# Patient Record
Sex: MALE | Race: WHITE | NOT HISPANIC OR LATINO | Employment: FULL TIME | URBAN - METROPOLITAN AREA
[De-identification: names, ages, dates, MRNs, and addresses within clinical notes are randomized per-mention and may not be internally consistent; named-entity substitution may affect disease eponyms.]

---

## 2017-08-29 ENCOUNTER — HOSPITAL ENCOUNTER (OUTPATIENT)
Dept: RADIOLOGY | Facility: MEDICAL CENTER | Age: 32
End: 2017-08-29
Attending: NURSE PRACTITIONER
Payer: COMMERCIAL

## 2017-08-29 ENCOUNTER — OFFICE VISIT (OUTPATIENT)
Dept: URGENT CARE | Facility: PHYSICIAN GROUP | Age: 32
End: 2017-08-29
Payer: COMMERCIAL

## 2017-08-29 VITALS
SYSTOLIC BLOOD PRESSURE: 130 MMHG | TEMPERATURE: 98.7 F | HEIGHT: 74 IN | WEIGHT: 187 LBS | DIASTOLIC BLOOD PRESSURE: 84 MMHG | HEART RATE: 86 BPM | BODY MASS INDEX: 24 KG/M2 | OXYGEN SATURATION: 99 % | RESPIRATION RATE: 16 BRPM

## 2017-08-29 DIAGNOSIS — M79.89 SWELLING OF RIGHT RING FINGER: ICD-10-CM

## 2017-08-29 PROCEDURE — 73140 X-RAY EXAM OF FINGER(S): CPT | Mod: RT

## 2017-08-29 PROCEDURE — 99204 OFFICE O/P NEW MOD 45 MIN: CPT | Performed by: NURSE PRACTITIONER

## 2017-08-29 NOTE — PROGRESS NOTES
"Subjective:      Pedro Ott is a 32 y.o. male who presents with Hand Injury (right ring finger, x 6days)            Pt states he was playing softball 6 days ago when he dislocated his right ring finger. He reduced the finger himself and it otherwise felt fine, but this week the finger remains swollen and difficult to bend. He tried a makeshift splint at home without much relief. He is worried it may be broken or still dislocated.         Review of Systems   Musculoskeletal: Positive for joint pain (right ring finger).   All other systems reviewed and are negative.    No past medical history on file. No past surgical history on file.   Social History     Social History   • Marital status: Single     Spouse name: N/A   • Number of children: N/A   • Years of education: N/A     Occupational History   • Not on file.     Social History Main Topics   • Smoking status: Light Tobacco Smoker     Packs/day: 0.25     Types: Cigarettes   • Smokeless tobacco: Never Used   • Alcohol use Yes      Comment: socially   • Drug use: No   • Sexual activity: Not on file     Other Topics Concern   • Not on file     Social History Narrative   • No narrative on file          Objective:     /84   Pulse 86   Temp 37.1 °C (98.7 °F)   Resp 16   Ht 1.88 m (6' 2\")   Wt 84.8 kg (187 lb)   SpO2 99%   BMI 24.01 kg/m²      Physical Exam   Constitutional: He is oriented to person, place, and time. Vital signs are normal. He appears well-developed and well-nourished.   HENT:   Head: Normocephalic and atraumatic.   Eyes: EOM are normal. Pupils are equal, round, and reactive to light.   Neck: Normal range of motion.   Cardiovascular: Normal rate and regular rhythm.    Pulmonary/Chest: Effort normal.   Musculoskeletal: Normal range of motion.        Right hand: He exhibits swelling.        Hands:  Neurological: He is alert and oriented to person, place, and time.   Skin: Skin is warm and dry.   Psychiatric: He has a normal mood and affect. " His speech is normal and behavior is normal. Thought content normal.   Vitals reviewed.         Xray: no fracture or dislocation by my read. Radiology review pending.       8/29/2017 3:31 PM    HISTORY/REASON FOR EXAM:  Pain/Deformity Following Trauma.  .    TECHNIQUE/EXAM DESCRIPTION AND NUMBER OF VIEWS:  3 views of the RIGHT fingers.    COMPARISON: None    FINDINGS:  The alignment and mineralization are normal. No focal areas of soft tissue swelling are appreciated. No fracture or dislocation are identified.   Impression       No fracture or dislocation are appreciated.       Assessment/Plan:     1. Swelling of right ring finger  - DX-FINGER(S) 2+ RIGHT; Future    Straight finger immobilizer applied  Instructed pt to keep this in place for 1-2 weeks  Watch for resolution of swelling  Ibuprofen PRN  RICE  RTC if swelling does not improve with this tx, he V/U  Supportive care, differential diagnoses, and indications for immediate follow-up discussed with patient.    Pathogenesis of diagnosis discussed including typical length and natural progression.      Instructed to return to  or nearest emergency department if symptoms fail to improve, for any change in condition, further concerns, or new concerning symptoms.  Patient states understanding of the plan of care and discharge instructions.

## 2019-12-17 ENCOUNTER — OFFICE VISIT (OUTPATIENT)
Dept: URGENT CARE | Facility: PHYSICIAN GROUP | Age: 34
End: 2019-12-17
Payer: COMMERCIAL

## 2019-12-17 VITALS
OXYGEN SATURATION: 99 % | SYSTOLIC BLOOD PRESSURE: 126 MMHG | BODY MASS INDEX: 23.61 KG/M2 | HEART RATE: 71 BPM | TEMPERATURE: 98.6 F | HEIGHT: 74 IN | DIASTOLIC BLOOD PRESSURE: 74 MMHG | WEIGHT: 184 LBS

## 2019-12-17 DIAGNOSIS — B00.1 COLD SORE: ICD-10-CM

## 2019-12-17 PROCEDURE — 99214 OFFICE O/P EST MOD 30 MIN: CPT | Performed by: PHYSICIAN ASSISTANT

## 2019-12-17 RX ORDER — VALACYCLOVIR HYDROCHLORIDE 1 G/1
2000 TABLET, FILM COATED ORAL 2 TIMES DAILY
Qty: 4 TAB | Refills: 0 | Status: SHIPPED | OUTPATIENT
Start: 2019-12-17 | End: 2019-12-18

## 2019-12-17 ASSESSMENT — ENCOUNTER SYMPTOMS
EYE DISCHARGE: 0
FEVER: 0
SHORTNESS OF BREATH: 0
MYALGIAS: 0
HEADACHES: 0
SORE THROAT: 0
VOMITING: 0
NAUSEA: 0
COUGH: 0
EYE REDNESS: 0

## 2019-12-18 NOTE — PROGRESS NOTES
Subjective:      Pedro Ott is a 34 y.o. male who presents with Cold Sores ([id number] )        HPI    This is a new problem.   The patient presents to clinic c/o a cold to the left corner of his mouth x 4 days. The patient states he has been experiencing intermittent cold sores for the past 2 months. The patient states on average the cold sores last approx. 1.5 weeks. The patient believes the cold sore are related to shaving. The patient states he has changes is razor, but is still experiencing cold sores after shaving. The patient states the cold sore alternate sides from right to left. The patient states the cold sores are not painful. He denies surrounding redness, swelling, and drainage/discharge from the lesions. The patient has tried multiple home remedies, including rubbing alcohol, Abreva, and Neosporin.    The patient denies any additional outbreaks.       PMH:  has no past medical history on file.  MEDS:   Current Outpatient Medications:   •  ibuprofen (MOTRIN) 800 MG TABS, Take 800 mg by mouth every 8 hours as needed., Disp: , Rfl:   •  oxycodone-acetaminophen (PERCOCET) 5-325 MG TABS, Take 1-2 Tabs by mouth every four hours as needed for Mild Pain (Prn pain). (Patient not taking: Reported on 12/17/2019), Disp: 30 Each, Rfl: 0  ALLERGIES: No Known Allergies  SURGHX: No past surgical history on file.  SOCHX:  reports that he has been smoking cigarettes. He has been smoking about 0.25 packs per day. He has never used smokeless tobacco. He reports current alcohol use. He reports that he does not use drugs.  FH: Family history was reviewed, no pertinent findings to report      Review of Systems   Constitutional: Negative for fever.   HENT: Negative for congestion, ear pain and sore throat.    Eyes: Negative for discharge and redness.   Respiratory: Negative for cough and shortness of breath.    Cardiovascular: Negative for chest pain and leg swelling.   Gastrointestinal: Negative for nausea and vomiting.  "  Musculoskeletal: Negative for myalgias.   Skin: Negative for rash.   Neurological: Negative for headaches.          Objective:     /74   Pulse 71   Temp 37 °C (98.6 °F) (Temporal)   Ht 1.88 m (6' 2\")   Wt 83.5 kg (184 lb)   SpO2 99%   BMI 23.62 kg/m²      Physical Exam  Constitutional:       General: He is not in acute distress.     Appearance: Normal appearance.   HENT:      Head: Normocephalic and atraumatic.        Comments:   Perioral Region:  Small vesicular lesion to the left corner of the patients mouth. No tenderness to palpation. No swelling. No surrounding erythema. No increased warmth. No discharge/drainage.      Right Ear: External ear normal.      Left Ear: External ear normal.      Nose: Nose normal.      Mouth/Throat:      Mouth: Mucous membranes are moist.      Pharynx: Oropharynx is clear. No posterior oropharyngeal erythema.   Eyes:      Extraocular Movements: Extraocular movements intact.      Conjunctiva/sclera: Conjunctivae normal.   Neck:      Musculoskeletal: Normal range of motion and neck supple.   Cardiovascular:      Rate and Rhythm: Normal rate and regular rhythm.      Heart sounds: Normal heart sounds.   Pulmonary:      Effort: Pulmonary effort is normal. No respiratory distress.      Breath sounds: Normal breath sounds.   Musculoskeletal: Normal range of motion.   Skin:     General: Skin is warm and dry.   Neurological:      Mental Status: He is alert and oriented to person, place, and time.                 Assessment/Plan:     1. Cold sore  - valacyclovir (VALTREX) 1 GM Tab; Take 2 Tabs by mouth 2 times a day for 1 day.  Dispense: 4 Tab; Refill: 0    The patient is requesting to be treated for a cold sore at this time.  Will prescribe the patient Valacyclovir for his current symptoms.  Recommend the patient follow-up with his PCP.  Discussed return precautions with the patient, and he verbalized understanding.    Differential diagnoses, supportive care, and indications " for immediate follow-up discussed with patient.   Instructed to return to clinic or nearest emergency department for any change in condition, further concerns, or worsening of symptoms.    OTC Tylenol or Motrin for fever/discomfort.  Keep area clean and dry  Monitor for signs of infection   Follow-up with PCP   Return to clinic or go to the ED if symptoms worsen or fail to improve, or if patient develop worsening/increasing/persistent lesions to the area around his mouth, pain/tenderness, swelling, increased redness or warmth to the affected area, discharge/drainage, fever/chills, secondary signs of infection, and/or any concerning symptoms.    Discussed plan with the patient, and she agrees to the above.

## 2020-04-23 ENCOUNTER — APPOINTMENT (RX ONLY)
Dept: URBAN - METROPOLITAN AREA CLINIC 4 | Facility: CLINIC | Age: 35
Setting detail: DERMATOLOGY
End: 2020-04-23

## 2020-04-23 DIAGNOSIS — K13.0 DISEASES OF LIPS: ICD-10-CM

## 2020-04-23 DIAGNOSIS — L21.8 OTHER SEBORRHEIC DERMATITIS: ICD-10-CM

## 2020-04-23 PROCEDURE — ? ADDITIONAL NOTES

## 2020-04-23 PROCEDURE — ? COUNSELING

## 2020-04-23 PROCEDURE — 99202 OFFICE O/P NEW SF 15 MIN: CPT

## 2020-04-23 PROCEDURE — ? PRESCRIPTION

## 2020-04-23 RX ORDER — HYDROCORTISONE 25 MG/G
CREAM TOPICAL
Qty: 1 | Refills: 3 | Status: ERX | COMMUNITY
Start: 2020-04-23

## 2020-04-23 RX ADMIN — HYDROCORTISONE: 25 CREAM TOPICAL at 00:00

## 2020-04-23 ASSESSMENT — LOCATION DETAILED DESCRIPTION DERM
LOCATION DETAILED: LEFT SUPERIOR VERMILION LIP
LOCATION DETAILED: LEFT NASAL ALA
LOCATION DETAILED: LEFT NASAL ALAR GROOVE
LOCATION DETAILED: RIGHT UPPER CUTANEOUS LIP
LOCATION DETAILED: RIGHT ORAL COMMISSURE

## 2020-04-23 ASSESSMENT — LOCATION ZONE DERM
LOCATION ZONE: LIP
LOCATION ZONE: NOSE

## 2020-04-23 ASSESSMENT — LOCATION SIMPLE DESCRIPTION DERM
LOCATION SIMPLE: LEFT LIP
LOCATION SIMPLE: RIGHT LIP
LOCATION SIMPLE: LEFT NOSE

## 2020-04-23 NOTE — PROCEDURE: ADDITIONAL NOTES
Additional Notes: Mild - Not much rash visible today, slight erythema \\nPatient denies any recent dental work \\nRecommended keeping skin protected with Vaseline \\nDuring flares, pt to use a 1:1 ratio of hydrocortisone 2.5% and clotrimazole 1% BID prn\\nTake photos
Detail Level: Simple
Additional Notes: Mild today\\nIf recurs, trial of 1:1 ratio of hydrocortisone 2.5% and clotrimazole 1% BID prn

## 2020-04-23 NOTE — HPI: RASH
How Severe Is Your Rash?: mild
Is This A New Presentation, Or A Follow-Up?: Rash
Additional History: Patient has had cracking in the corners of his mouth on and off for about a year. States that it looks similar to a cold sore and lasts weeks at a time. He has tried neosporin, Vaseline, cold sore medications. At one point, it spread to the corners of his nose, but it has since cleared and not returned

## 2020-04-23 NOTE — PROCEDURE: MIPS QUALITY
Quality 130: Documentation Of Current Medications In The Medical Record: Current Medications Documented
Detail Level: Detailed
Quality 110: Preventive Care And Screening: Influenza Immunization: Influenza immunization was not ordered or administered, reason not given
Quality 226: Preventive Care And Screening: Tobacco Use: Screening And Cessation Intervention: Patient screened for tobacco use and is an ex/non-smoker

## 2020-05-28 ENCOUNTER — OFFICE VISIT (OUTPATIENT)
Dept: MEDICAL GROUP | Facility: MEDICAL CENTER | Age: 35
End: 2020-05-28
Payer: COMMERCIAL

## 2020-05-28 VITALS
RESPIRATION RATE: 16 BRPM | OXYGEN SATURATION: 96 % | HEIGHT: 74 IN | TEMPERATURE: 97.8 F | BODY MASS INDEX: 24.64 KG/M2 | SYSTOLIC BLOOD PRESSURE: 118 MMHG | DIASTOLIC BLOOD PRESSURE: 76 MMHG | WEIGHT: 192 LBS | HEART RATE: 74 BPM

## 2020-05-28 DIAGNOSIS — Z00.00 WELL ADULT EXAM: ICD-10-CM

## 2020-05-28 DIAGNOSIS — Z72.0 TOBACCO USE: ICD-10-CM

## 2020-05-28 DIAGNOSIS — Z13.6 SCREENING FOR CARDIOVASCULAR CONDITION: ICD-10-CM

## 2020-05-28 PROCEDURE — 99395 PREV VISIT EST AGE 18-39: CPT | Performed by: FAMILY MEDICINE

## 2020-05-28 ASSESSMENT — PATIENT HEALTH QUESTIONNAIRE - PHQ9: CLINICAL INTERPRETATION OF PHQ2 SCORE: 0

## 2020-05-28 NOTE — PROGRESS NOTES
cc: well exam    Subjective:     Pedro Ott is a 35 y.o. male presents for a routine preventive health exam.    Screening/Preventative Topics:  Advanced directive: not discussed   Osteoporosis Screen/ DEXA: n/a   PT/vit D for falls prevention: n/a   Cholesterol Screening: ordered   Diabetes Screening: ordered  AAA Screening: n/a  Aspirin Use: n/a    Diet: well rounded, healthy  Exercise: active, snowboarding, softball  Screen for depression: PHQ-2: 0   Substance Abuse: none   Seat belts, bike helmet, gun safety discussed.  Sun protection used.    Cancer screening  Colorectal Cancer Screening: n/a     Lung Cancer Screening: n/a  Prostate Cancer Screening/PSA: n/a     Infectious disease screening  --STI Screening: declined   --Practices safe sex.  --HIV Screening: declined   --Syphilis Screening: n/a   --Hepatitis C Screening: n/a   --Latent TB Screening: n/a     Immunizations:   Influenza: n/a  Hepatitis B: n/a   Tetanus: td 2019  Shingles: n/a  Pneumococcal: discussed, declined      Review of systems:  Denies any weight loss, fevers, fatigue, vision changes, sore throat, swollen glands, rashes, shortness of breath, chest pain, numbness, nausea, vomiting, diarrhea, constipation, abdominal pain, dysuria, hematuria, joint pain, muscle weakness, depression.  He does report an episode where he might have inhaled an apple Jack, had some discomfort in the right chest area, but this has resolved.      Current Outpatient Medications:   •  ibuprofen (MOTRIN) 800 MG TABS, Take 800 mg by mouth every 8 hours as needed., Disp: , Rfl:     No Known Allergies    History reviewed. No pertinent past medical history.  History reviewed. No pertinent surgical history.  History reviewed. No pertinent family history.  Social History     Socioeconomic History   • Marital status: Single     Spouse name: Not on file   • Number of children: Not on file   • Years of education: Not on file   • Highest education level: Not on file   Tobacco Use  "  • Smoking status: Light Tobacco Smoker     Packs/day: 0.25     Types: Cigarettes   • Smokeless tobacco: Never Used   Substance and Sexual Activity   • Alcohol use: Yes     Comment: socially   • Drug use: No   • Sexual activity: Not on file       Objective:     Vitals: /76   Pulse 74   Temp 36.6 °C (97.8 °F)   Resp 16   Ht 1.88 m (6' 2\")   Wt 87.1 kg (192 lb)   SpO2 96%   BMI 24.65 kg/m²   General: Alert, pleasant, NAD  HEENT:  Normocephalic.  PERRL, EOMI, no icterus or pallor.  Conjunctivae and lids normal.  External ears normal. Tympanic membranes pearly, opaque.  Nares patent, mucosa pink.  Oropharynx non-erythematous, mucous membranes moist.  Neck supple.  No thyromegaly or masses palpated. No cervical or supraclavicular lymphadenopathy.  Heart:  Regular rate and rhythm.  S1 and S2 normal.  No murmurs appreciated.  Respiratory:  Normal respiratory effort.  Clear to auscultation bilaterally.  Abdomen:  Bowel sounds present.  Non-distended, soft, non-tender, no guarding/rebound. No hepatosplenomegaly.  No hernias.  Skin:  Warm, dry, no rashes  Musculoskeletal:  Gait is normal.  Moves all extremities well.  Extremities: No leg edema.  Neurological: No tremors, sensation grossly intact, patellar and biceps reflexes 2+ symmetric, tone/strength normal  Psych:  Affect/mood is normal, judgement is good, memory is intact, grooming is appropriate.    Assessment/Plan:     Pedro was seen today for establish care.    Diagnoses and all orders for this visit:    Well adult exam  Patient Counseling:  --Discussed moderation in sodium/caffeine intake, saturated fat and cholesterol, caloric balance, sufficient fresh fruits/vegetables, fiber, iron  --Discussed brushing, flossing, and dental visits.   --Encouraged regular exercise.   --Discussed tobacco, alcohol, or other drug use; availability of treatment for abuse.   --Discussed sexually transmitted infections  --Injury prevention: Discussed safety belts, safety " helmets, smoke detector, etc.  -     Lipid Profile; Future  -     Blood Glucose; Future    Screening for cardiovascular condition  -     Lipid Profile; Future  -     Blood Glucose; Future    Tobacco use  Advised to quit, patient is not interested.  He declined the pneumonia vaccine      Preventive visit in 1 year, sooner as needed for any concerns.

## 2021-05-27 ENCOUNTER — OFFICE VISIT (OUTPATIENT)
Dept: URGENT CARE | Facility: PHYSICIAN GROUP | Age: 36
End: 2021-05-27
Payer: COMMERCIAL

## 2021-05-27 VITALS
OXYGEN SATURATION: 100 % | WEIGHT: 190 LBS | HEIGHT: 74 IN | DIASTOLIC BLOOD PRESSURE: 90 MMHG | BODY MASS INDEX: 24.38 KG/M2 | RESPIRATION RATE: 18 BRPM | HEART RATE: 70 BPM | TEMPERATURE: 97.8 F | SYSTOLIC BLOOD PRESSURE: 128 MMHG

## 2021-05-27 DIAGNOSIS — B00.1 HERPES LABIALIS WITHOUT COMPLICATION: ICD-10-CM

## 2021-05-27 PROCEDURE — 99213 OFFICE O/P EST LOW 20 MIN: CPT | Performed by: NURSE PRACTITIONER

## 2021-05-27 RX ORDER — ACYCLOVIR 400 MG/1
400 TABLET ORAL 3 TIMES DAILY
Qty: 21 TABLET | Refills: 2 | Status: SHIPPED | OUTPATIENT
Start: 2021-05-27 | End: 2021-06-03

## 2021-05-27 RX ORDER — ACYCLOVIR 50 MG/G
1 OINTMENT TOPICAL
Qty: 5 G | Refills: 0 | Status: SHIPPED | OUTPATIENT
Start: 2021-05-27 | End: 2021-05-31

## 2021-05-27 ASSESSMENT — ENCOUNTER SYMPTOMS
DIZZINESS: 0
ABDOMINAL PAIN: 0
NAUSEA: 0
HEADACHES: 0
CHILLS: 0
SORE THROAT: 0
FEVER: 0

## 2021-05-27 NOTE — PROGRESS NOTES
"Subjective:      Pedro Ott is a 36 y.o. male who presents with Other (pt states he needs topical anti viral...)        Is a pleasant 36-year-old male with history of cold sores who presents with a new oral lesion on the left side of his lower lip.  Patient has done well with Zovirax ointment in the past and requests a refill today.  He would also like to try oral medication.    Oral Pain  This is a new problem. The current episode started yesterday. The problem occurs constantly. The problem has been unchanged. Pertinent negatives include no abdominal pain, chills, congestion, fever, headaches, nausea or sore throat. Nothing aggravates the symptoms. He has tried nothing for the symptoms. The treatment provided no relief.       Review of Systems   Constitutional: Negative for chills, fever and malaise/fatigue.   HENT: Negative for congestion and sore throat.    Gastrointestinal: Negative for abdominal pain and nausea.   Skin:        +Cold sore   Neurological: Negative for dizziness and headaches.          Objective:     /90   Pulse 70   Temp 36.6 °C (97.8 °F) (Temporal)   Resp 18   Ht 1.88 m (6' 2\")   Wt 86.2 kg (190 lb)   SpO2 100%   BMI 24.39 kg/m²      Physical Exam  Vitals reviewed.   Constitutional:       Appearance: Normal appearance. He is not ill-appearing.   HENT:      Head: Normocephalic.      Right Ear: External ear normal.      Left Ear: External ear normal.      Nose: Nose normal.      Mouth/Throat:      Lips: Pink. Lesions ( Cold sore) present.      Mouth: Mucous membranes are moist.     Eyes:      Extraocular Movements: Extraocular movements intact.      Conjunctiva/sclera: Conjunctivae normal.      Pupils: Pupils are equal, round, and reactive to light.   Cardiovascular:      Rate and Rhythm: Normal rate and regular rhythm.      Heart sounds: Normal heart sounds.   Pulmonary:      Effort: Pulmonary effort is normal.   Musculoskeletal:         General: Normal range of motion.      " Cervical back: Normal range of motion.   Skin:     General: Skin is warm and dry.      Capillary Refill: Capillary refill takes less than 2 seconds.   Neurological:      Mental Status: He is alert and oriented to person, place, and time.   Psychiatric:         Mood and Affect: Mood normal.         Behavior: Behavior normal.                        Assessment/Plan:        1. Herpes labialis without complication  acyclovir (ZOVIRAX) 5 % Ointment    acyclovir (ZOVIRAX) 400 MG tablet     Supportive care, differential diagnoses, and indications for immediate follow-up discussed with patient.    Pathogenesis of diagnosis discussed including typical length and natural progression. Patient expresses understanding and agrees to plan.     Instructed patient to follow up with PCP or return to clinic for worsening symptoms or symptoms that persist for 7 to 10 days     Please note that this dictation was created using voice recognition software. I have made every reasonable attempt to correct obvious errors, but I expect that there are errors of grammar and possibly content that I did not discover before finalizing the note.

## 2021-06-21 ENCOUNTER — OFFICE VISIT (OUTPATIENT)
Dept: URGENT CARE | Facility: PHYSICIAN GROUP | Age: 36
End: 2021-06-21
Payer: COMMERCIAL

## 2021-06-21 VITALS
WEIGHT: 190 LBS | BODY MASS INDEX: 24.38 KG/M2 | OXYGEN SATURATION: 98 % | HEIGHT: 74 IN | SYSTOLIC BLOOD PRESSURE: 142 MMHG | DIASTOLIC BLOOD PRESSURE: 98 MMHG | RESPIRATION RATE: 14 BRPM | TEMPERATURE: 98.2 F | HEART RATE: 86 BPM

## 2021-06-21 DIAGNOSIS — B00.1 COLD SORE: ICD-10-CM

## 2021-06-21 PROCEDURE — 99213 OFFICE O/P EST LOW 20 MIN: CPT | Performed by: STUDENT IN AN ORGANIZED HEALTH CARE EDUCATION/TRAINING PROGRAM

## 2021-06-21 RX ORDER — VALACYCLOVIR HYDROCHLORIDE 500 MG/1
500 TABLET, FILM COATED ORAL DAILY
Qty: 30 TABLET | Refills: 3 | Status: SHIPPED | OUTPATIENT
Start: 2021-06-21 | End: 2021-09-15 | Stop reason: SDUPTHER

## 2021-06-21 RX ORDER — ACYCLOVIR 400 MG/1
TABLET ORAL
COMMUNITY
Start: 2021-06-12

## 2021-06-22 ENCOUNTER — TELEPHONE (OUTPATIENT)
Dept: URGENT CARE | Facility: PHYSICIAN GROUP | Age: 36
End: 2021-06-22

## 2021-06-22 NOTE — PROGRESS NOTES
"Subjective:   CHIEF COMPLAINT  Chief Complaint   Patient presents with   • Herpes Zoster     lips, needs refill valacycovir, took medications and still not gone       HPI  Pedro Ott is a 36 y.o. male who presents with a chief complaint of recurrent cold sores.  Patient reports he previously was getting cold sores annually to biannually which resolved without any intervention.  However the last 12 weeks the patient says he had at least 2-3 outbreaks.  Says symptoms seem to be worse during the summer months.  He was seen in urgent care approximately 4 weeks ago and started on acyclovir which seemed to help however the symptoms keep returning.  He has completed his prescription of acyclovir.  The patient is requesting to possibly start on a daily suppressive therapy.        REVIEW OF SYSTEMS  General: no fever or chills  GI: no nausea or vomiting  See HPI for further details.    PAST MEDICAL HISTORY  Patient Active Problem List    Diagnosis Date Noted   • Tobacco use 05/28/2020       SURGICAL HISTORY  patient denies any surgical history    ALLERGIES  No Known Allergies    CURRENT MEDICATIONS  Home Medications     Reviewed by Jules Mendiola (Medical Assistant) on 06/21/21 at 1722  Med List Status: <None>   Medication Last Dose Status   acyclovir (ZOVIRAX) 400 MG tablet PRN Active   ibuprofen (MOTRIN) 800 MG TABS  Active                SOCIAL HISTORY  Social History     Tobacco Use   • Smoking status: Light Tobacco Smoker     Packs/day: 0.25     Types: Cigarettes   • Smokeless tobacco: Never Used   Vaping Use   • Vaping Use: Never used   Substance and Sexual Activity   • Alcohol use: Yes     Comment: socially   • Drug use: No   • Sexual activity: Not on file       FAMILY HISTORY  No family history on file.       Objective:   PHYSICAL EXAM  VITAL SIGNS: /98   Pulse 86   Temp 36.8 °C (98.2 °F)   Resp 14   Ht 1.88 m (6' 2\")   Wt 86.2 kg (190 lb)   SpO2 98%   BMI 24.39 kg/m²     Gen: no acute " distress, normal voice  Skin: papulovesicular lesion along the left angle of the mouth  Lungs: CTAB w/ symmetric expansion  CV: RRR w/o murmurs or clicks  Psych: normal affect, normal judgement, alert, awake    Assessment/Plan:     1. Cold sore  valACYclovir (VALTREX) 500 MG Tab   Si/sc c/w HSV infection.  Symptoms seem to improve with acyclovir but continue to return.  Patient is requesting daily suppressive therapy which is completely reasonable.  -Ordered Valtrex x 4 months for suppressive therapy  -Instructed the patient follow-up with his PCP for reevaluation and continued management    Differential diagnosis, natural history, supportive care, and indications for immediate follow-up discussed. All questions answered. Patient agrees with the plan of care.    Follow-up as needed if symptoms worsen or fail to improve to PCP, Urgent care or Emergency Room.    Please note that this dictation was created using voice recognition software. I have made a reasonable attempt to correct obvious errors, but I expect that there are errors of grammar and possibly content that I did not discover before finalizing the note.

## 2021-09-15 ENCOUNTER — OFFICE VISIT (OUTPATIENT)
Dept: URGENT CARE | Facility: PHYSICIAN GROUP | Age: 36
End: 2021-09-15
Payer: COMMERCIAL

## 2021-09-15 VITALS
HEIGHT: 74 IN | DIASTOLIC BLOOD PRESSURE: 80 MMHG | WEIGHT: 190 LBS | BODY MASS INDEX: 24.38 KG/M2 | HEART RATE: 80 BPM | SYSTOLIC BLOOD PRESSURE: 120 MMHG | OXYGEN SATURATION: 97 % | RESPIRATION RATE: 16 BRPM | TEMPERATURE: 99.4 F

## 2021-09-15 DIAGNOSIS — B00.1 HERPES LABIALIS WITHOUT COMPLICATION: ICD-10-CM

## 2021-09-15 PROCEDURE — 99214 OFFICE O/P EST MOD 30 MIN: CPT | Performed by: FAMILY MEDICINE

## 2021-09-15 RX ORDER — VALACYCLOVIR HYDROCHLORIDE 500 MG/1
500 TABLET, FILM COATED ORAL DAILY
Qty: 30 TABLET | Refills: 3 | Status: SHIPPED | OUTPATIENT
Start: 2021-09-15

## 2021-09-15 RX ORDER — DOXYCYCLINE HYCLATE 100 MG/1
CAPSULE ORAL
COMMUNITY
Start: 2021-08-28

## 2021-09-15 ASSESSMENT — ENCOUNTER SYMPTOMS
SORE THROAT: 0
SHORTNESS OF BREATH: 0
DIZZINESS: 0
NAUSEA: 0
MYALGIAS: 0
FEVER: 0
CHILLS: 0
COUGH: 0
VOMITING: 0

## 2021-09-16 NOTE — PROGRESS NOTES
Subjective:   Pedro Ott is a 36 y.o. male who presents for Medication Refill (Pt stated med refill (acyclovir))        Oral Pain  This is a new (Recent vesicular crusting lesion right lateral lips consistent with prior episodes of oral herpes labialis) problem. Associated symptoms include a rash (Right lateral lip). Pertinent negatives include no chills, coughing, fever, myalgias, nausea, sore throat or vomiting. Associated symptoms comments: Complains of tingling at lesion site    Previous valacyclovir is decreased severity of outbreaks for which patient requesting a refill. Exacerbated by: Stress. Treatments tried: Valacyclovir. The treatment provided moderate relief.     PMH:  has no past medical history on file.  MEDS:   Current Outpatient Medications:   •  valACYclovir (VALTREX) 500 MG Tab, Take 1 Tablet by mouth every day. Can be increased to 1 g daily (ie two tablets) for breakthrough occurrences, Disp: 30 Tablet, Rfl: 3  •  acyclovir (ZOVIRAX) 400 MG tablet, TAKE 1 TABLET BY MOUTH THREE TIMES DAILY FOR 7 DAYS, Disp: , Rfl:   •  ibuprofen (MOTRIN) 800 MG TABS, Take 800 mg by mouth every 8 hours as needed., Disp: , Rfl:   •  doxycycline (VIBRAMYCIN) 100 MG Cap, TAKE 1 CAPSULE BY MOUTH TWICE A DAY FOR 10 DAYS, Disp: , Rfl:   ALLERGIES: No Known Allergies  SURGHX: No past surgical history on file.  SOCHX:  reports that he has been smoking cigarettes. He has been smoking about 0.25 packs per day. He has never used smokeless tobacco. He reports current alcohol use. He reports that he does not use drugs.  FH: No family history on file.  Review of Systems   Constitutional: Negative for chills and fever.   HENT: Negative for sore throat.    Respiratory: Negative for cough and shortness of breath.    Gastrointestinal: Negative for nausea and vomiting.   Musculoskeletal: Negative for myalgias.   Skin: Positive for rash (Right lateral lip).   Neurological: Negative for dizziness.        Objective:   /80   Pulse  "80   Temp 37.4 °C (99.4 °F) (Temporal)   Resp 16   Ht 1.88 m (6' 2\")   Wt 86.2 kg (190 lb)   SpO2 97%   BMI 24.39 kg/m²   Physical Exam  Vitals and nursing note reviewed.   Constitutional:       General: He is not in acute distress.     Appearance: He is well-developed.   HENT:      Head: Normocephalic and atraumatic.      Right Ear: External ear normal.      Left Ear: External ear normal.      Nose: Nose normal.      Mouth/Throat:      Mouth: Mucous membranes are moist.     Eyes:      Conjunctiva/sclera: Conjunctivae normal.   Cardiovascular:      Rate and Rhythm: Normal rate.   Pulmonary:      Effort: Pulmonary effort is normal. No respiratory distress.      Breath sounds: Normal breath sounds.   Abdominal:      General: There is no distension.   Musculoskeletal:         General: Normal range of motion.   Skin:     General: Skin is warm and dry.   Neurological:      General: No focal deficit present.      Mental Status: He is alert and oriented to person, place, and time. Mental status is at baseline.      Gait: Gait (gait at baseline) normal.   Psychiatric:         Judgment: Judgment normal.           Assessment/Plan:   1. Herpes labialis without complication  - valACYclovir (VALTREX) 500 MG Tab; Take 1 Tablet by mouth every day. Can be increased to 1 g daily (ie two tablets) for breakthrough occurrences  Dispense: 30 Tablet; Refill: 3    Other orders  - doxycycline (VIBRAMYCIN) 100 MG Cap; TAKE 1 CAPSULE BY MOUTH TWICE A DAY FOR 10 DAYS      Medical Decision Making/Course:  In the course of preparing for this visit with review of the pertinent past medical history, recent and past clinic visits, current medications, and performing chart, immunization, medical history and medication reconciliation, and in the further course of obtaining the current history pertinent to the clinic visit today, performing an exam and evaluation, ordering and independently evaluating labs, tests, and/or procedures, prescribing " any recommended new medications as noted above, providing any pertinent counseling and education and recommending further coordination of care, at least 20 minutes of total time were spent during this encounter.      Discussed close monitoring, return precautions, and supportive measures of maintaining adequate fluid hydration and caloric intake, relative rest and symptom management as needed for pain and/or fever.    Differential diagnosis, natural history, supportive care, and indications for immediate follow-up discussed.     Advised the patient to follow-up with the primary care physician for recheck, reevaluation, and consideration of further management.    Please note that this dictation was created using voice recognition software. I have worked with consultants from the vendor as well as technical experts from [employer] to optimize the interface. I have made every reasonable attempt to correct obvious errors, but I expect that there are errors of grammar and possibly content that I did not discover before finalizing the note.

## 2021-09-16 NOTE — PATIENT INSTRUCTIONS
Herpes Labialis  You have a fever blister or cold sore (herpes labialis). These painful, grouped sores are caused by one of the herpes viruses (HSV1 most commonly). They are usually found around the lips and mouth, but the same infection can also affect other areas on the face such as the nose and eyes. Herpes infections take about 10 days to heal. They often occur again and again in the same spot. Other symptoms may include numbness and tingling in the involved skin, achiness, fever, and swollen glands in the neck. Colds, emotional stress, injuries, or excess sunlight exposure all seem to make herpes reappear. Herpes lip infections are contagious. Direct contact with these sores can spread the infection. It can also be spread to other parts of your own body.  TREATMENT   Herpes labialis is usually self-limited and resolves within 1 week. To reduce pain and swelling, apply ice packs frequently to the sores or suck on popsicles or frozen juice bars. Antiviral medicine may be used by mouth to shorten the duration of the breakout. Avoid spreading the infection by washing your hands often. Be careful not to touch your eyes or genital areas after handling the infected blisters. Do not kiss or have other intimate contact with others. After the blisters are completely healed you may resume contact. Use sunscreen to lessen recurrences.   If this is your first infection with herpes, or if you have a severe or repeated infections, your caregiver may prescribe one of the anti-viral drugs to speed up the healing. If you have sun-related flare-ups despite the use of sunscreen, starting oral anti-viral medicine before a prolonged exposure (going skiing or to the beach) can prevent most episodes.   SEEK IMMEDIATE MEDICAL CARE IF:  · You develop a headache, sleepiness, high fever, vomiting, or severe weakness.  · You have eye irritation, pain, blurred vision or redness.  · You develop a prolonged infection not getting better in 10  days.     This information is not intended to replace advice given to you by your health care provider. Make sure you discuss any questions you have with your health care provider.     Document Released: 12/18/2006 Document Revised: 03/11/2013 Document Reviewed: 10/22/2010  [employer] Interactive Patient Education ©2016 [employer]

## 2023-08-30 ENCOUNTER — DOCUMENTATION (OUTPATIENT)
Dept: HEALTH INFORMATION MANAGEMENT | Facility: OTHER | Age: 38
End: 2023-08-30
Payer: COMMERCIAL

## 2024-03-06 ENCOUNTER — TELEPHONE (OUTPATIENT)
Dept: HEALTH INFORMATION MANAGEMENT | Facility: OTHER | Age: 39
End: 2024-03-06
Payer: COMMERCIAL